# Patient Record
Sex: FEMALE | Race: BLACK OR AFRICAN AMERICAN | Employment: OTHER | URBAN - METROPOLITAN AREA
[De-identification: names, ages, dates, MRNs, and addresses within clinical notes are randomized per-mention and may not be internally consistent; named-entity substitution may affect disease eponyms.]

---

## 2019-03-26 ENCOUNTER — OP HISTORICAL/CONVERTED ENCOUNTER (OUTPATIENT)
Dept: OTHER | Age: 59
End: 2019-03-26

## 2019-06-10 ENCOUNTER — OP HISTORICAL/CONVERTED ENCOUNTER (OUTPATIENT)
Dept: OTHER | Age: 59
End: 2019-06-10

## 2019-10-08 ENCOUNTER — OP HISTORICAL/CONVERTED ENCOUNTER (OUTPATIENT)
Dept: OTHER | Age: 59
End: 2019-10-08

## 2021-03-17 ENCOUNTER — TRANSCRIBE ORDER (OUTPATIENT)
Dept: SCHEDULING | Age: 61
End: 2021-03-17

## 2021-03-17 DIAGNOSIS — Z12.31 SCREENING MAMMOGRAM FOR HIGH-RISK PATIENT: Primary | ICD-10-CM

## 2021-03-30 ENCOUNTER — HOSPITAL ENCOUNTER (OUTPATIENT)
Dept: MAMMOGRAPHY | Age: 61
Discharge: HOME | End: 2021-03-30
Payer: MEDICAID

## 2021-03-30 DIAGNOSIS — Z12.31 SCREENING MAMMOGRAM FOR HIGH-RISK PATIENT: ICD-10-CM

## 2021-03-30 PROCEDURE — 77067 SCR MAMMO BI INCL CAD: CPT

## 2022-02-12 ENCOUNTER — HOSPITAL ENCOUNTER (EMERGENCY)
Age: 62
Discharge: HOME | End: 2022-02-12
Attending: EMERGENCY MEDICINE
Payer: MEDICARE

## 2022-02-12 VITALS
TEMPERATURE: 98.5 F | OXYGEN SATURATION: 99 % | WEIGHT: 285 LBS | HEART RATE: 71 BPM | SYSTOLIC BLOOD PRESSURE: 137 MMHG | RESPIRATION RATE: 18 BRPM | HEIGHT: 65 IN | DIASTOLIC BLOOD PRESSURE: 70 MMHG | BODY MASS INDEX: 47.48 KG/M2

## 2022-02-12 DIAGNOSIS — R07.89 OTHER CHEST PAIN: Primary | ICD-10-CM

## 2022-02-12 LAB
ALBUMIN SERPL-MCNC: 2.9 G/DL (ref 3.5–5)
ALBUMIN/GLOB SERPL: 0.6 {RATIO} (ref 1.1–2.2)
ALP SERPL-CCNC: 72 U/L (ref 45–117)
ALT SERPL-CCNC: 14 U/L (ref 12–78)
ANION GAP SERPL CALC-SCNC: 8 MMOL/L (ref 5–15)
AST SERPL W P-5'-P-CCNC: 11 U/L (ref 15–37)
BASOPHILS # BLD: 0.2 K/UL (ref 0–0.2)
BASOPHILS NFR BLD: 2 % (ref 0–2.5)
BILIRUB SERPL-MCNC: 0.4 MG/DL (ref 0.2–1)
BUN SERPL-MCNC: 14 MG/DL (ref 6–20)
BUN/CREAT SERPL: 13 (ref 12–20)
CA-I BLD-MCNC: 8.8 MG/DL (ref 8.5–10.1)
CHLORIDE SERPL-SCNC: 101 MMOL/L (ref 97–108)
CO2 SERPL-SCNC: 30 MMOL/L (ref 21–32)
CREAT SERPL-MCNC: 1.04 MG/DL (ref 0.55–1.02)
EOSINOPHIL # BLD: 0.2 K/UL (ref 0–0.7)
EOSINOPHIL NFR BLD: 2 % (ref 0.9–2.9)
ERYTHROCYTE [DISTWIDTH] IN BLOOD BY AUTOMATED COUNT: 14.9 % (ref 11.5–14.5)
GLOBULIN SER CALC-MCNC: 4.6 G/DL (ref 2–4)
GLUCOSE SERPL-MCNC: 103 MG/DL (ref 65–100)
HCT VFR BLD AUTO: 37.4 % (ref 36–46)
HGB BLD-MCNC: 12.3 G/DL (ref 13.5–17.5)
LYMPHOCYTES # BLD: 2.8 K/UL (ref 1–4.8)
LYMPHOCYTES NFR BLD: 27 % (ref 20.5–51.1)
MCH RBC QN AUTO: 26.4 PG (ref 31–34)
MCHC RBC AUTO-ENTMCNC: 33 G/DL (ref 31–36)
MCV RBC AUTO: 79.9 FL (ref 80–100)
MONOCYTES # BLD: 0.6 K/UL (ref 0.2–2.4)
MONOCYTES NFR BLD: 6 % (ref 1.7–9.3)
NEUTS SEG # BLD: 6.6 K/UL (ref 1.8–7.7)
NEUTS SEG NFR BLD: 63 % (ref 42–75)
NRBC # BLD: 0.02 K/UL
NRBC BLD-RTO: 0.1 PER 100 WBC
PLATELET # BLD AUTO: 358 K/UL (ref 150–400)
PMV BLD AUTO: 8.3 FL (ref 6.5–11.5)
POTASSIUM SERPL-SCNC: 4.2 MMOL/L (ref 3.5–5.1)
PROT SERPL-MCNC: 7.5 G/DL (ref 6.4–8.2)
RBC # BLD AUTO: 4.68 M/UL (ref 4.5–5.9)
SODIUM SERPL-SCNC: 139 MMOL/L (ref 136–145)
TROPONIN-HIGH SENSITIVITY: 6 NG/L (ref 0–51)
WBC # BLD AUTO: 10.3 K/UL (ref 4.4–11.3)

## 2022-02-12 PROCEDURE — 80053 COMPREHEN METABOLIC PANEL: CPT

## 2022-02-12 PROCEDURE — 84484 ASSAY OF TROPONIN QUANT: CPT

## 2022-02-12 PROCEDURE — 85025 COMPLETE CBC W/AUTO DIFF WBC: CPT

## 2022-02-12 PROCEDURE — 93005 ELECTROCARDIOGRAM TRACING: CPT

## 2022-02-12 PROCEDURE — 99283 EMERGENCY DEPT VISIT LOW MDM: CPT

## 2022-02-12 PROCEDURE — 36415 COLL VENOUS BLD VENIPUNCTURE: CPT

## 2022-02-12 NOTE — ED PROVIDER NOTES
HPI   Patient reports that after using the restroom short while ago she developed sharp stabbing left-sided chest pain that emanated from the left lateral chest wall and radiated to the mid sternum. She notified her son who summoned EMS. She also reports that during this time she felt \"dizzy. \"  By this, she reports a sensation of vertigo which she has had before. The chest pain resolved completely short while later and has not returned. She denies shortness of breath, diaphoresis, nausea. Patient reports that she has had similar episodes in the past that were found to be negative for ischemia. Patient also reports pain and swelling in the antecubital fossa of her left forearm which she attributes to repetitive motion playing video games. Past Medical History:   Diagnosis Date    Asthma     Hypertension        No past surgical history on file. No family history on file. Social History     Socioeconomic History    Marital status:      Spouse name: Not on file    Number of children: Not on file    Years of education: Not on file    Highest education level: Not on file   Occupational History    Not on file   Tobacco Use    Smoking status: Never Smoker    Smokeless tobacco: Not on file   Substance and Sexual Activity    Alcohol use: No    Drug use: Not on file    Sexual activity: Not on file   Other Topics Concern    Not on file   Social History Narrative    Not on file     Social Determinants of Health     Financial Resource Strain:     Difficulty of Paying Living Expenses: Not on file   Food Insecurity:     Worried About Running Out of Food in the Last Year: Not on file    Caitlyn of Food in the Last Year: Not on file   Transportation Needs:     Lack of Transportation (Medical): Not on file    Lack of Transportation (Non-Medical):  Not on file   Physical Activity:     Days of Exercise per Week: Not on file    Minutes of Exercise per Session: Not on file   Stress:     Feeling of Stress : Not on file   Social Connections:     Frequency of Communication with Friends and Family: Not on file    Frequency of Social Gatherings with Friends and Family: Not on file    Attends Sabianism Services: Not on file    Active Member of Clubs or Organizations: Not on file    Attends Club or Organization Meetings: Not on file    Marital Status: Not on file   Intimate Partner Violence:     Fear of Current or Ex-Partner: Not on file    Emotionally Abused: Not on file    Physically Abused: Not on file    Sexually Abused: Not on file   Housing Stability:     Unable to Pay for Housing in the Last Year: Not on file    Number of [place] in the Last Year: Not on file    Unstable Housing in the Last Year: Not on file         ALLERGIES: Percocet [oxycodone-acetaminophen], Prednisone, and Vicodin [hydrocodone-acetaminophen]    Review of Systems   Constitutional: Negative. HENT: Negative. Eyes: Negative. Respiratory: Negative. Cardiovascular: Positive for chest pain. Gastrointestinal: Negative. Endocrine: Negative. Genitourinary: Negative. Musculoskeletal: Positive for arthralgias and myalgias. Allergic/Immunologic: Negative. Neurological: Positive for dizziness. Hematological: Negative. Psychiatric/Behavioral: Negative. All other systems reviewed and are negative. There were no vitals filed for this visit. Physical Exam  Vitals and nursing note reviewed. Constitutional:       General: She is not in acute distress. Appearance: Normal appearance. She is obese. She is not ill-appearing, toxic-appearing or diaphoretic. HENT:      Head: Normocephalic and atraumatic. Nose: Nose normal.      Mouth/Throat:      Mouth: Mucous membranes are moist.      Pharynx: Oropharynx is clear. Eyes:      Extraocular Movements: Extraocular movements intact.       Conjunctiva/sclera: Conjunctivae normal.      Pupils: Pupils are equal, round, and reactive to light. Cardiovascular:      Rate and Rhythm: Normal rate and regular rhythm. Pulses: Normal pulses. Heart sounds: Normal heart sounds. No murmur heard. No friction rub. No gallop. Pulmonary:      Effort: Pulmonary effort is normal. No respiratory distress. Breath sounds: Normal breath sounds. No stridor. No wheezing, rhonchi or rales. Chest:      Chest wall: No tenderness. Musculoskeletal:         General: Tenderness present. No swelling, deformity or signs of injury. Normal range of motion. Cervical back: Normal range of motion. No rigidity. Right lower leg: No edema. Left lower leg: No edema. Comments: Point tenderness and some heat appreciated at the lateral epicondyle on the left consistent with epicondylitis   Skin:     General: Skin is warm and dry. Coloration: Skin is not jaundiced or pale. Findings: No bruising, erythema, lesion or rash. Neurological:      General: No focal deficit present. Mental Status: She is alert and oriented to person, place, and time. Mental status is at baseline. Cranial Nerves: No cranial nerve deficit. Sensory: No sensory deficit. Motor: No weakness. Coordination: Coordination normal.      Gait: Gait normal.   Psychiatric:         Mood and Affect: Mood normal.         Behavior: Behavior normal.          MDM     Chest pain does not seem to be due to ischemia given its description and complete resolution. Will check EKG and high-sensitivity troponin. I suspect patient will be okay for discharge.   Procedures

## 2022-02-12 NOTE — ED TRIAGE NOTES
.  Chief Complaint   Patient presents with    Chest Pain     Pt presents with c/o chest pain and dizziness that she states started when she went to bathroom, pt states pain has eased off at this time, had been placed on holter monitor. Pt states chest left sided to midsternal region. Pt has pmh of anxiety. pt rates pain 3/10. Pt VS stable, A&Ox4.      Dizziness

## 2022-02-14 LAB
ATRIAL RATE: 89 BPM
CALCULATED P AXIS, ECG09: 46 DEGREES
CALCULATED R AXIS, ECG10: -3 DEGREES
CALCULATED T AXIS, ECG11: 1 DEGREES
DIAGNOSIS, 93000: NORMAL
P-R INTERVAL, ECG05: 189 MS
Q-T INTERVAL, ECG07: 335 MS
QRS DURATION, ECG06: 90 MS
QTC CALCULATION (BEZET), ECG08: 406 MS
VENTRICULAR RATE, ECG03: 88 BPM

## 2022-05-09 ENCOUNTER — HOSPITAL ENCOUNTER (EMERGENCY)
Age: 62
Discharge: HOME | End: 2022-05-09
Attending: EMERGENCY MEDICINE
Payer: MEDICAID

## 2022-05-09 VITALS
SYSTOLIC BLOOD PRESSURE: 172 MMHG | HEIGHT: 65 IN | HEART RATE: 97 BPM | BODY MASS INDEX: 47.75 KG/M2 | WEIGHT: 286.6 LBS | RESPIRATION RATE: 20 BRPM | TEMPERATURE: 98 F | DIASTOLIC BLOOD PRESSURE: 85 MMHG | OXYGEN SATURATION: 97 %

## 2022-05-09 DIAGNOSIS — S00.512A ABRASION OF BUCCAL MUCOSA, INITIAL ENCOUNTER: Primary | ICD-10-CM

## 2022-05-09 PROCEDURE — 99282 EMERGENCY DEPT VISIT SF MDM: CPT

## 2022-05-09 NOTE — ED PROVIDER NOTES
EMERGENCY DEPARTMENT HISTORY AND PHYSICAL EXAM      Date: 5/9/2022  Patient Name: Zak Singh    History of Presenting Illness     Chief Complaint   Patient presents with    Dental Injury       History Provided By: Patient    HPI: Zak Singh, 65 y.o. female with a past medical history significant hypertension and obesity presents to the ED with cc of injury to the right inner cheek eating, patient states she bit the inner part of her cheek    There are no other complaints, changes, or physical findings at this time. PCP: Angélica King NP    No current facility-administered medications on file prior to encounter. Current Outpatient Medications on File Prior to Encounter   Medication Sig Dispense Refill    albuterol (PROVENTIL VENTOLIN) 2.5 mg /3 mL (0.083 %) nebulizer solution by Nebulization route once.  amLODIPine-valsartan (EXFORGE)  mg per tablet Take 1 Tab by mouth daily.  cetirizine (ZYRTEC) 10 mg tablet Take 1 Tab by mouth daily. 20 Tab 0    predniSONE (STERAPRED DS) 10 mg dose pack Take  by mouth. See administration instruction per 10mg dose pack 21 Tab 0       Past History     Past Medical History:  Past Medical History:   Diagnosis Date    Asthma     Hypertension        Past Surgical History:  No past surgical history on file. Family History:  History reviewed. No pertinent family history. Social History:  Social History     Tobacco Use    Smoking status: Never Smoker    Smokeless tobacco: Never Used   Substance Use Topics    Alcohol use: No    Drug use: Not on file       Allergies: Allergies   Allergen Reactions    Lisinopril Anaphylaxis    Percocet [Oxycodone-Acetaminophen] Nausea and Vomiting    Prednisone Other (comments)     \"hallucinations\"    Vicodin [Hydrocodone-Acetaminophen] Nausea and Vomiting         Review of Systems     Review of Systems   Constitutional: Negative for chills and fever. HENT: Positive for dental problem.  Negative for rhinorrhea and sore throat. Eyes: Negative for pain and visual disturbance. Respiratory: Negative for cough and shortness of breath. Cardiovascular: Negative for chest pain and leg swelling. Gastrointestinal: Negative for abdominal pain and vomiting. Endocrine: Negative for polydipsia and polyuria. Genitourinary: Negative for dysuria and hematuria. Musculoskeletal: Negative for back pain and neck pain. Skin: Negative for color change and pallor. Neurological: Negative for weakness and headaches. Psychiatric/Behavioral: Negative for agitation and suicidal ideas. Physical Exam     Physical Exam  Vitals and nursing note reviewed. Constitutional:       General: She is not in acute distress. Appearance: She is not ill-appearing, toxic-appearing or diaphoretic. HENT:      Head: Normocephalic and atraumatic. Right Ear: Tympanic membrane normal.      Left Ear: Tympanic membrane normal.      Nose: Nose normal. No congestion. Mouth/Throat:      Mouth: Mucous membranes are moist.      Pharynx: Oropharynx is clear. Comments: Buccal mucosa intact there is a small area of erythema no soft tissue swelling no active bleeding nontender  Eyes:      Extraocular Movements: Extraocular movements intact. Conjunctiva/sclera: Conjunctivae normal.      Pupils: Pupils are equal, round, and reactive to light. Cardiovascular:      Rate and Rhythm: Normal rate and regular rhythm. Pulses: Normal pulses. Heart sounds: Normal heart sounds. Pulmonary:      Effort: Pulmonary effort is normal.      Breath sounds: Normal breath sounds. Abdominal:      General: Bowel sounds are normal.      Palpations: Abdomen is soft. Tenderness: There is no abdominal tenderness. Musculoskeletal:         General: No tenderness, deformity or signs of injury. Normal range of motion. Cervical back: Normal range of motion and neck supple. No rigidity or tenderness.    Lymphadenopathy: Cervical: No cervical adenopathy. Skin:     General: Skin is warm and dry. Capillary Refill: Capillary refill takes less than 2 seconds. Findings: No rash. Neurological:      General: No focal deficit present. Mental Status: She is alert and oriented to person, place, and time. Cranial Nerves: No cranial nerve deficit. Sensory: No sensory deficit. Psychiatric:         Mood and Affect: Mood normal.         Behavior: Behavior normal.         Lab and Diagnostic Study Results     Labs -   No results found for this or any previous visit (from the past 12 hour(s)). Radiologic Studies -   @lastxrresult@  CT Results  (Last 48 hours)    None        CXR Results  (Last 48 hours)    None            Medical Decision Making   - I am the first provider for this patient. - I reviewed the vital signs, available nursing notes, past medical history, past surgical history, family history and social history. - Initial assessment performed. The patients presenting problems have been discussed, and they are in agreement with the care plan formulated and outlined with them. I have encouraged them to ask questions as they arise throughout their visit. Vital Signs-Reviewed the patient's vital signs. Patient Vitals for the past 12 hrs:   Temp Pulse Resp BP SpO2   05/09/22 1753 98 °F (36.7 °C) 97 20 (!) 172/85 97 %       Records Reviewed: Nursing Notes    The patient presents with mouth injury with a differential diagnosis of avulsed tooth, laceration, jaw dislocation      ED Course:          Provider Notes (Medical Decision Making): MDM       Procedures   Medical Decision Makingedical Decision Making  Performed by: Arturo Farris MD  PROCEDURES:  Procedures       Disposition   Disposition: Condition stable and resolved  DC- Adult Discharges: All of the diagnostic tests were reviewed and questions answered. Diagnosis, care plan and treatment options were discussed.   The patient understands the instructions and will follow up as directed. The patients results have been reviewed with them. They have been counseled regarding their diagnosis. The patient verbally convey understanding and agreement of the signs, symptoms, diagnosis, treatment and prognosis and additionally agrees to follow up as recommended with their PCP in 24 - 48 hours. They also agree with the care-plan and convey that all of their questions have been answered. I have also put together some discharge instructions for them that include: 1) educational information regarding their diagnosis, 2) how to care for their diagnosis at home, as well a 3) list of reasons why they would want to return to the ED prior to their follow-up appointment, should their condition change. Discharged    DISCHARGE PLAN:  1. Current Discharge Medication List      CONTINUE these medications which have NOT CHANGED    Details   albuterol (PROVENTIL VENTOLIN) 2.5 mg /3 mL (0.083 %) nebulizer solution by Nebulization route once. amLODIPine-valsartan (EXFORGE)  mg per tablet Take 1 Tab by mouth daily. cetirizine (ZYRTEC) 10 mg tablet Take 1 Tab by mouth daily. Qty: 20 Tab, Refills: 0      predniSONE (STERAPRED DS) 10 mg dose pack Take  by mouth. See administration instruction per 10mg dose pack  Qty: 21 Tab, Refills: 0           2. Follow-up Information     Follow up With Specialties Details Why Contact Info    Ajay Guerrero NP Nurse Practitioner Schedule an appointment as soon as possible for a visit   [address] [place] [address]  [phone]          3. Return to ED if worse   4. Current Discharge Medication List            Diagnosis     Clinical Impression:   1. Abrasion of buccal mucosa, initial encounter        Attestations:    Jessica Govea MD    Please note that this dictation was completed with Mofang, the [employer] voice recognition software.   Quite often unanticipated grammatical, syntax, homophones, and other interpretive errors are inadvertently transcribed by the computer software. Please disregard these errors. Please excuse any errors that have escaped final proofreading. Thank you.

## 2022-08-26 ENCOUNTER — HOSPITAL ENCOUNTER (EMERGENCY)
Age: 62
Discharge: HOME | End: 2022-08-26
Attending: EMERGENCY MEDICINE
Payer: MEDICARE

## 2022-08-26 VITALS
WEIGHT: 286 LBS | RESPIRATION RATE: 19 BRPM | BODY MASS INDEX: 47.65 KG/M2 | HEIGHT: 65 IN | SYSTOLIC BLOOD PRESSURE: 161 MMHG | DIASTOLIC BLOOD PRESSURE: 79 MMHG | HEART RATE: 94 BPM | OXYGEN SATURATION: 97 % | TEMPERATURE: 98 F

## 2022-08-26 DIAGNOSIS — J02.9 ACUTE PHARYNGITIS, UNSPECIFIED ETIOLOGY: Primary | ICD-10-CM

## 2022-08-26 LAB — DEPRECATED S PYO AG THROAT QL EIA: NEGATIVE

## 2022-08-26 PROCEDURE — 99283 EMERGENCY DEPT VISIT LOW MDM: CPT

## 2022-08-26 PROCEDURE — 87880 STREP A ASSAY W/OPTIC: CPT

## 2022-08-26 PROCEDURE — 87077 CULTURE AEROBIC IDENTIFY: CPT

## 2022-08-26 PROCEDURE — 87070 CULTURE OTHR SPECIMN AEROBIC: CPT

## 2022-08-27 LAB
BACTERIA SPEC CULT: NORMAL
SPECIAL REQUESTS,SREQ: NORMAL

## 2022-08-27 NOTE — ED PROVIDER NOTES
EMERGENCY DEPARTMENT HISTORY AND PHYSICAL EXAM      Date: 8/26/2022  Patient Name: Reina Gómez    History of Presenting Illness     Chief Complaint   Patient presents with    Sore Throat       History Provided By: Patient    HPI: Reina Gómez, 65 y.o. female c/o sore throat  for 3 days. No nausea, vomiting, cough, fatigue, fevers or chills. Patient has not been around anyone with sore throat. There are no other complaints, changes, or physical findings at this time. PCP: Mahad Harrison NP    No current facility-administered medications on file prior to encounter. Current Outpatient Medications on File Prior to Encounter   Medication Sig Dispense Refill    albuterol (PROVENTIL VENTOLIN) 2.5 mg /3 mL (0.083 %) nebulizer solution by Nebulization route once. amLODIPine-valsartan (EXFORGE)  mg per tablet Take 1 Tab by mouth daily. cetirizine (ZYRTEC) 10 mg tablet Take 1 Tab by mouth daily. 20 Tab 0    [DISCONTINUED] predniSONE (STERAPRED DS) 10 mg dose pack Take  by mouth. See administration instruction per 10mg dose pack 21 Tab 0       Past History     Past Medical History:  Past Medical History:   Diagnosis Date    Asthma     Hypertension        Past Surgical History:  No past surgical history on file. Family History:  History reviewed. No pertinent family history. Social History:  Social History     Tobacco Use    Smoking status: Never    Smokeless tobacco: Never   Substance Use Topics    Alcohol use: No       Allergies: Allergies   Allergen Reactions    Lisinopril Anaphylaxis    Percocet [Oxycodone-Acetaminophen] Nausea and Vomiting    Prednisone Other (comments)     \"hallucinations\"    Vicodin [Hydrocodone-Acetaminophen] Nausea and Vomiting       Review of Systems   Review of Systems   Constitutional: Negative. Negative for chills and fever. HENT:  Positive for sore throat. Eyes: Negative. Respiratory: Negative. Negative for cough. Cardiovascular: Negative. Gastrointestinal: Negative. Endocrine: Negative. Genitourinary: Negative. Musculoskeletal: Negative. Skin: Negative. Allergic/Immunologic: Negative. Neurological: Negative. Hematological: Negative. Psychiatric/Behavioral: Negative. Physical Exam   Physical Exam  Vitals and nursing note reviewed. Constitutional:       Appearance: Normal appearance. She is obese. HENT:      Head: Normocephalic. Right Ear: Tympanic membrane normal.      Left Ear: Tympanic membrane normal.      Nose: Nose normal.      Mouth/Throat:      Mouth: Mucous membranes are moist. No oral lesions. Pharynx: Posterior oropharyngeal erythema present. Tonsils: No tonsillar exudate or tonsillar abscesses. 0 on the right. 0 on the left. Comments: White Exudate on tongue. Eyes:      Pupils: Pupils are equal, round, and reactive to light. Cardiovascular:      Rate and Rhythm: Normal rate. Pulses: Normal pulses. Heart sounds: Normal heart sounds. Pulmonary:      Effort: Pulmonary effort is normal.   Abdominal:      General: Abdomen is flat. Palpations: Abdomen is soft. Musculoskeletal:         General: Normal range of motion. Cervical back: Normal range of motion and neck supple. Skin:     General: Skin is warm. Capillary Refill: Capillary refill takes less than 2 seconds. Neurological:      General: No focal deficit present. Mental Status: She is alert.    Psychiatric:         Mood and Affect: Mood normal.       Lab and Diagnostic Study Results   Labs -     Recent Results (from the past 12 hour(s))   STREP AG SCREEN, GROUP A    Collection Time: 08/26/22  5:16 PM    Specimen: Throat   Result Value Ref Range    Group A Strep Ag ID Negative         Radiologic Studies -   @lastxrresult@  CT Results  (Last 48 hours)      None          CXR Results  (Last 48 hours)      None            Medical Decision Making and ED Course   Differential Diagnosis & Medical Decision Making Provider Note: Strep phargnitis, phargnitis, sinusitis, bronchitis, viral illness      - I am the first provider for this patient. I reviewed the vital signs, available nursing notes, past medical history, past surgical history, family history and social history. The patients presenting problems have been discussed, and they are in agreement with the care plan formulated and outlined with them. I have encouraged them to ask questions as they arise throughout their visit. Vital Signs-Reviewed the patient's vital signs. Patient Vitals for the past 12 hrs:   Temp Pulse Resp BP SpO2   08/26/22 1717 98 °F (36.7 °C) 94 19 (!) 161/79 97 %       ED Course:          Procedures   Performed by: Rockford Spurling, MD  Procedures      Disposition   Disposition: Condition stable  DC-The patient was given verbal phargnitis instructions    DISCHARGE PLAN:  1. Current Discharge Medication List        CONTINUE these medications which have NOT CHANGED    Details   albuterol (PROVENTIL VENTOLIN) 2.5 mg /3 mL (0.083 %) nebulizer solution by Nebulization route once. amLODIPine-valsartan (EXFORGE)  mg per tablet Take 1 Tab by mouth daily. cetirizine (ZYRTEC) 10 mg tablet Take 1 Tab by mouth daily. Qty: 20 Tab, Refills: 0           2. Follow-up Information    None       3. Return to ED if worse   4. Current Discharge Medication List         Remove if admitted/transferred    Diagnosis/Clinical Impression     Clinical Impression:    ICD-10-CM ICD-9-CM    1. Acute pharyngitis, unspecified etiology  J02.9 462                 Attestations: I, Rockford Spurling, MD, am the primary clinician of record. Please note that this dictation was completed with [employer], the [employer] voice recognition software. Quite often unanticipated grammatical, syntax, homophones, and other interpretive errors are inadvertently transcribed by the computer software. Please disregard these errors.   Please excuse any errors that have escaped final proofreading. Thank you.

## 2023-07-10 ENCOUNTER — HOSPITAL ENCOUNTER (OUTPATIENT)
Facility: HOSPITAL | Age: 63
Discharge: HOME | End: 2023-07-13
Payer: MEDICARE

## 2023-07-10 DIAGNOSIS — Z12.31 VISIT FOR SCREENING MAMMOGRAM: ICD-10-CM

## 2023-07-10 PROCEDURE — 77063 BREAST TOMOSYNTHESIS BI: CPT

## 2023-07-10 PROCEDURE — 77067 SCR MAMMO BI INCL CAD: CPT

## 2023-12-15 ENCOUNTER — HOSPITAL ENCOUNTER (OUTPATIENT)
Facility: HOSPITAL | Age: 63
End: 2023-12-15
Payer: MEDICARE

## 2023-12-15 DIAGNOSIS — M79.604 PAIN OF RIGHT LOWER EXTREMITY: ICD-10-CM

## 2023-12-15 DIAGNOSIS — R20.0 NUMBNESS OF RIGHT LOWER EXTREMITY: ICD-10-CM

## 2023-12-15 PROCEDURE — 93971 EXTREMITY STUDY: CPT

## 2024-10-11 ENCOUNTER — HOSPITAL ENCOUNTER (OUTPATIENT)
Facility: HOSPITAL | Age: 64
Discharge: HOME | End: 2024-10-14
Payer: MEDICARE

## 2024-10-11 DIAGNOSIS — Z12.31 OTHER SCREENING MAMMOGRAM: ICD-10-CM

## 2024-10-11 PROCEDURE — 77067 SCR MAMMO BI INCL CAD: CPT

## 2025-02-25 ENCOUNTER — APPOINTMENT (OUTPATIENT)
Facility: HOSPITAL | Age: 65
End: 2025-02-25
Payer: MEDICARE

## 2025-02-25 ENCOUNTER — HOSPITAL ENCOUNTER (EMERGENCY)
Facility: HOSPITAL | Age: 65
Discharge: HOME | End: 2025-02-25
Attending: EMERGENCY MEDICINE
Payer: MEDICARE

## 2025-02-25 VITALS
BODY MASS INDEX: 47.65 KG/M2 | HEART RATE: 85 BPM | WEIGHT: 286 LBS | DIASTOLIC BLOOD PRESSURE: 53 MMHG | TEMPERATURE: 97.4 F | SYSTOLIC BLOOD PRESSURE: 143 MMHG | HEIGHT: 65 IN | RESPIRATION RATE: 17 BRPM | OXYGEN SATURATION: 97 %

## 2025-02-25 DIAGNOSIS — T07.XXXA MULTIPLE CONTUSIONS: ICD-10-CM
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
## 2025-02-25 PROCEDURE — 73060 X-RAY EXAM OF HUMERUS: CPT

## 2025-02-25 PROCEDURE — 99283 EMERGENCY DEPT VISIT LOW MDM: CPT

## 2025-02-25 PROCEDURE — 73562 X-RAY EXAM OF KNEE 3: CPT

## 2025-02-25 PROCEDURE — 73030 X-RAY EXAM OF SHOULDER: CPT

## 2025-02-25 RX ORDER — ACETAMINOPHEN 500 MG
1000 TABLET ORAL
Status: DISCONTINUED | OUTPATIENT
Start: 2025-02-25 | End: 2025-02-25 | Stop reason: HOSPADM

## 2025-02-25 ASSESSMENT — PAIN DESCRIPTION - ORIENTATION: ORIENTATION: RIGHT;LEFT

## 2025-02-25 ASSESSMENT — PAIN DESCRIPTION - LOCATION
LOCATION: KNEE
LOCATION: KNEE;SHOULDER

## 2025-02-25 ASSESSMENT — LIFESTYLE VARIABLES
HOW MANY STANDARD DRINKS CONTAINING ALCOHOL DO YOU HAVE ON A TYPICAL DAY: PATIENT DOES NOT DRINK
HOW OFTEN DO YOU HAVE A DRINK CONTAINING ALCOHOL: NEVER

## 2025-02-25 ASSESSMENT — PAIN - FUNCTIONAL ASSESSMENT
PAIN_FUNCTIONAL_ASSESSMENT: 0-10
PAIN_FUNCTIONAL_ASSESSMENT: 0-10

## 2025-02-25 ASSESSMENT — PAIN DESCRIPTION - DESCRIPTORS: DESCRIPTORS: ACHING

## 2025-02-25 ASSESSMENT — PAIN SCALES - GENERAL
PAINLEVEL_OUTOF10: 8
PAINLEVEL_OUTOF10: 10

## 2025-02-25 NOTE — ED TRIAGE NOTES
PT reports fall PTA reports she tripped landing on her left knee, Pt denies hitting head. Also reports left shoulder and right arm pain. Pt reports she landed on [place]. Per pt she had to crawl to a nearby couch to get herself up off the floor. PT ambulatory to exam room

## 2025-02-26 NOTE — ED PROVIDER NOTES
[facility] EMERGENCY DEPARTMENT  EMERGENCY DEPARTMENT ENCOUNTER      Pt Name: Janice Angelo  MRN: [id number]  Birthdate 1960  Date of evaluation: 2/25/2025  Provider: eJovanny Bolaños MD    CHIEF COMPLAINT       Chief Complaint   Patient presents with    Fall         HISTORY OF PRESENT ILLNESS   (Location/Symptom, Timing/Onset, Context/Setting, Quality, Duration, Modifying Factors, Severity)  Note limiting factors.   Janice Angelo is a 65 y.o. female who presents to the emergency department after suffering ground-level fall carrying out the trash when the patient tripped injuring her left knee left shoulder and right upper arm no loss of consciousness no head neck or back injury    HPI    Nursing Notes were reviewed.    REVIEW OF SYSTEMS    (2-9 systems for level 4, 10 or more for level 5)     Review of Systems   All other systems reviewed and are negative.      Except as noted above the remainder of the review of systems was reviewed and negative.       PAST MEDICAL HISTORY     Past Medical History:   Diagnosis Date    Asthma     Hypertension          SURGICAL HISTORY     History reviewed. No pertinent surgical history.      CURRENT MEDICATIONS       Previous Medications    ALBUTEROL (PROVENTIL) (2.5 MG/3ML) 0.083% NEBULIZER SOLUTION    Inhale into the lungs once    AMLODIPINE-VALSARTAN (EXFORGE)  MG PER TABLET    Take 1 tablet by mouth daily    CETIRIZINE (ZYRTEC) 10 MG TABLET    Take 10 mg by mouth daily       ALLERGIES     Lisinopril, Prednisone, Hydrocodone-acetaminophen, and Oxycodone-acetaminophen    FAMILY HISTORY     History reviewed. No pertinent family history.       SOCIAL HISTORY       Social History     Socioeconomic History    Marital status:      Spouse name: None    Number of children: None    Years of education: None    Highest education level: None   Tobacco Use    Smoking status: Never    Smokeless tobacco: Never   Substance and Sexual Activity    Alcohol use: No

## 2025-02-26 NOTE — ED NOTES
Results of shoulder, humerus and knee x-rays are negative for fracture.  I shared findings with the patient.  She will take Tylenol as needed and apply ice.  She has a scheduled appointment with orthopedic surgeon in a week and will follow-up with him if pain is not improved.     Eleno, Juan HERNANDEZ MD  02/25/25 [id number]

## 2025-02-26 NOTE — ED NOTES
Patient care transfer note:    Patient care transferred at shift change by Dr. Bolaños.  X-rays are pending.  Treatment plan is x-rays are negative patient can be discharged.     Eleno, Juan HERNANDEZ MD  02/25/25 [id number]

## 2025-02-26 NOTE — ED NOTES
Patient refused hospital tylenol and stated she had tylenol in her bag.    was notified and approved she could take her tylenol.   Patient took 500 mg of tylenol from her supply.

## 2025-05-08 ENCOUNTER — HOSPITAL ENCOUNTER (EMERGENCY)
Facility: HOSPITAL | Age: 65
Discharge: LEFT BEFORE DISCHARGE | End: 2025-05-08

## 2025-05-08 VITALS
SYSTOLIC BLOOD PRESSURE: 167 MMHG | OXYGEN SATURATION: 100 % | WEIGHT: 286 LBS | HEART RATE: 82 BPM | DIASTOLIC BLOOD PRESSURE: 81 MMHG | BODY MASS INDEX: 47.65 KG/M2 | TEMPERATURE: 98.3 F | HEIGHT: 65 IN | RESPIRATION RATE: 18 BRPM

## 2025-05-08 ASSESSMENT — PAIN DESCRIPTION - LOCATION: LOCATION: HEAD

## 2025-05-08 ASSESSMENT — PAIN SCALES - GENERAL: PAINLEVEL_OUTOF10: 5

## 2025-05-08 ASSESSMENT — PAIN - FUNCTIONAL ASSESSMENT: PAIN_FUNCTIONAL_ASSESSMENT: 0-10

## 2025-05-08 NOTE — ED TRIAGE NOTES
PT report dizziness and HA that began today, reports she took two meclizine PTA. Pt ambulatory with a steady gait, no deficits noted. Pt also reports abscess to mid abd, reports she was planning on going to urgent care for that tomorrow.    Abdomen soft, nontender, nondistended, bowel sounds present in all 4 quadrants.